# Patient Record
Sex: MALE | Race: WHITE | NOT HISPANIC OR LATINO | Employment: OTHER | URBAN - METROPOLITAN AREA
[De-identification: names, ages, dates, MRNs, and addresses within clinical notes are randomized per-mention and may not be internally consistent; named-entity substitution may affect disease eponyms.]

---

## 2017-02-15 ENCOUNTER — HOSPITAL ENCOUNTER (EMERGENCY)
Facility: HOSPITAL | Age: 52
Discharge: HOME | End: 2017-02-15
Attending: EMERGENCY MEDICINE
Payer: MEDICARE

## 2017-02-15 VITALS
SYSTOLIC BLOOD PRESSURE: 134 MMHG | BODY MASS INDEX: 27.3 KG/M2 | RESPIRATION RATE: 17 BRPM | HEIGHT: 71 IN | TEMPERATURE: 98 F | WEIGHT: 195 LBS | HEART RATE: 73 BPM | DIASTOLIC BLOOD PRESSURE: 78 MMHG | OXYGEN SATURATION: 97 %

## 2017-02-15 PROCEDURE — 25000003 PHARM REV CODE 250: Performed by: EMERGENCY MEDICINE

## 2017-02-15 PROCEDURE — 96375 TX/PRO/DX INJ NEW DRUG ADDON: CPT

## 2017-02-15 PROCEDURE — 99284 EMERGENCY DEPT VISIT MOD MDM: CPT | Mod: 25

## 2017-02-15 PROCEDURE — 96372 THER/PROPH/DIAG INJ SC/IM: CPT

## 2017-02-15 PROCEDURE — 63600175 PHARM REV CODE 636 W HCPCS: Performed by: EMERGENCY MEDICINE

## 2017-02-15 PROCEDURE — 96374 THER/PROPH/DIAG INJ IV PUSH: CPT

## 2017-02-15 PROCEDURE — 90715 TDAP VACCINE 7 YRS/> IM: CPT | Performed by: EMERGENCY MEDICINE

## 2017-02-15 PROCEDURE — 12032 INTMD RPR S/A/T/EXT 2.6-7.5: CPT

## 2017-02-15 PROCEDURE — 90471 IMMUNIZATION ADMIN: CPT | Performed by: EMERGENCY MEDICINE

## 2017-02-15 RX ORDER — MORPHINE SULFATE 4 MG/ML
4 INJECTION, SOLUTION INTRAMUSCULAR; INTRAVENOUS
Status: COMPLETED | OUTPATIENT
Start: 2017-02-15 | End: 2017-02-15

## 2017-02-15 RX ORDER — CEFAZOLIN SODIUM 1 G/3ML
1 INJECTION, POWDER, FOR SOLUTION INTRAMUSCULAR; INTRAVENOUS
Status: COMPLETED | OUTPATIENT
Start: 2017-02-15 | End: 2017-02-15

## 2017-02-15 RX ORDER — ONDANSETRON 2 MG/ML
4 INJECTION INTRAMUSCULAR; INTRAVENOUS
Status: COMPLETED | OUTPATIENT
Start: 2017-02-15 | End: 2017-02-15

## 2017-02-15 RX ORDER — LIDOCAINE HYDROCHLORIDE 10 MG/ML
1 INJECTION, SOLUTION EPIDURAL; INFILTRATION; INTRACAUDAL; PERINEURAL ONCE
Status: COMPLETED | OUTPATIENT
Start: 2017-02-15 | End: 2017-02-15

## 2017-02-15 RX ORDER — SULFAMETHOXAZOLE AND TRIMETHOPRIM 800; 160 MG/1; MG/1
1 TABLET ORAL 2 TIMES DAILY
Qty: 20 TABLET | Refills: 0 | Status: SHIPPED | OUTPATIENT
Start: 2017-02-15 | End: 2017-02-15

## 2017-02-15 RX ORDER — SULFAMETHOXAZOLE AND TRIMETHOPRIM 800; 160 MG/1; MG/1
1 TABLET ORAL 2 TIMES DAILY
Qty: 20 TABLET | Refills: 0 | Status: SHIPPED | OUTPATIENT
Start: 2017-02-15 | End: 2017-02-25

## 2017-02-15 RX ORDER — MELOXICAM 15 MG/1
15 TABLET ORAL DAILY
Qty: 10 TABLET | Refills: 0 | Status: SHIPPED | OUTPATIENT
Start: 2017-02-15

## 2017-02-15 RX ORDER — MELOXICAM 15 MG/1
15 TABLET ORAL DAILY
Qty: 10 TABLET | Refills: 0 | Status: SHIPPED | OUTPATIENT
Start: 2017-02-15 | End: 2017-02-15

## 2017-02-15 RX ADMIN — LIDOCAINE HYDROCHLORIDE 10 MG: 10 INJECTION, SOLUTION EPIDURAL; INFILTRATION; INTRACAUDAL; PERINEURAL at 02:02

## 2017-02-15 RX ADMIN — CLOSTRIDIUM TETANI TOXOID ANTIGEN (FORMALDEHYDE INACTIVATED), CORYNEBACTERIUM DIPHTHERIAE TOXOID ANTIGEN (FORMALDEHYDE INACTIVATED), BORDETELLA PERTUSSIS TOXOID ANTIGEN (GLUTARALDEHYDE INACTIVATED), BORDETELLA PERTUSSIS FILAMENTOUS HEMAGGLUTININ ANTIGEN (FORMALDEHYDE INACTIVATED), BORDETELLA PERTUSSIS PERTACTIN ANTIGEN, AND BORDETELLA PERTUSSIS FIMBRIAE 2/3 ANTIGEN 0.5 ML: 5; 2; 2.5; 5; 3; 5 INJECTION, SUSPENSION INTRAMUSCULAR at 01:02

## 2017-02-15 RX ADMIN — CEFAZOLIN 1 G: 330 INJECTION, POWDER, FOR SOLUTION INTRAMUSCULAR; INTRAVENOUS at 03:02

## 2017-02-15 RX ADMIN — MORPHINE SULFATE 4 MG: 4 INJECTION, SOLUTION INTRAMUSCULAR; INTRAVENOUS at 12:02

## 2017-02-15 RX ADMIN — ONDANSETRON 4 MG: 2 INJECTION INTRAMUSCULAR; INTRAVENOUS at 12:02

## 2017-02-15 NOTE — ED NOTES
Dr. Retana finished with pt. Laceration to right elbow , both sutures and staples used. Dressing applied to wound.

## 2017-02-15 NOTE — ED AVS SNAPSHOT
[facility] -   [id number]  [place] [address]               Marcio Joseph   2/15/2017 11:46 AM   ED    Description:  Male : 1965   Department:  [facility] -            Your Care was Coordinated By:     Provider Role From To    Elier Retana MD Attending Provider 02/15/17 1158 --      Reason for Visit     Laceration           Diagnoses this Visit        Comments    Laceration           ED Disposition     None           To Do List            These Medications        Disp Refills Start End    meloxicam (MOBIC) 15 MG tablet 10 tablet 0 2/15/2017     Take 1 tablet (15 mg total) by mouth once daily. - Oral    sulfamethoxazole-trimethoprim 800-160mg (BACTRIM DS) 800-160 mg Tab 20 tablet 0 2/15/2017 2017    Take 1 tablet by mouth 2 (two) times daily. - Oral      [facility] Care [facility] -  Assistance  Registered nurses in the [facility] provide clinical advisement, health education, appointment booking, and other advisory services.  Call for this free service at [phone].             Medications           Message regarding Medications     Verify the changes and/or additions to your medication regime listed below are the same as discussed with your clinician today.  If any of these changes or additions are incorrect, please notify your healthcare provider.        START taking these NEW medications        Refills    meloxicam (MOBIC) 15 MG tablet 0    Sig: Take 1 tablet (15 mg total) by mouth once daily.    Class: Print    Route: Oral    sulfamethoxazole-trimethoprim 800-160mg (BACTRIM DS) 800-160 mg Tab 0    Sig: Take 1 tablet by mouth 2 (two) times daily.    Class: Print    Route: Oral      These medications were administered today        Dose Freq    morphine injection 4 mg 4 mg ED 1 Time    Sig: Inject 1 mL (4 mg total) into the vein ED 1 Time.    Class: Normal    Route:  Intravenous    ondansetron injection 4 mg 4 mg ED 1 Time    Sig: Inject 4 mg into the vein ED 1 Time.    Class: Normal    Route: Intravenous    Tdap vaccine injection 0.5 mL 0.5 mL Once    Sig: Inject 0.5 mLs into the muscle once.    Class: Normal    Route: Intramuscular    lidocaine (PF) 10 mg/ml (1%) injection 10 mg 1 mL Once    Si mL (10 mg total) by Other route once.    Class: Normal    Route: Other    lidocaine (PF) 10 mg/ml (1%) injection 10 mg 1 mL Once    Si mL (10 mg total) by Other route once.    Class: Normal    Route: Other    lidocaine (PF) 10 mg/ml (1%) injection 10 mg 1 mL Once    Si mL (10 mg total) by Other route once.    Class: Normal    Route: Other    lidocaine (PF) 10 mg/ml (1%) injection 10 mg 1 mL Once    Si mL (10 mg total) by Other route once.    Class: Normal    Route: Other    ceFAZolin injection 1 g 1 g ED 1 Time    Sig: Inject 1,000 mg (1 g total) into the muscle ED 1 Time.    Class: Normal    Route: Intramuscular           Verify that the below list of medications is an accurate representation of the medications you are currently taking.  If none reported, the list may be blank. If incorrect, please contact your healthcare provider. Carry this list with you in case of emergency.           Current Medications     ceFAZolin injection 1 g Inject 1,000 mg (1 g total) into the muscle ED 1 Time.    INSULIN DETEMIR (LEVEMIR SUBQ) Inject 40 Units into the skin every evening.    insulin detemir (LEVEMIR) 100 unit/mL injection Inject 10 Units into the skin once daily.    meloxicam (MOBIC) 15 MG tablet Take 1 tablet (15 mg total) by mouth once daily.    metformin (GLUCOPHAGE) 1000 MG tablet Take 1,000 mg by mouth 2 (two) times daily with meals.    sulfamethoxazole-trimethoprim 800-160mg (BACTRIM DS) 800-160 mg Tab Take 1 tablet by mouth 2 (two) times daily.           Clinical Reference Information           Your Vitals Were     BP Pulse Temp Resp Height Weight    148/76 87 97.4 °F  "(36.3 °C) 16 5' 11" (1.803 m) 88.5 kg (195 lb)    SpO2 BMI             97% 27.2 kg/m2         Allergies as of 2/15/2017     No Known Allergies      Immunizations Administered on Date of Encounter - 2/15/2017     Name Date Dose VIS Date Route    TDAP 2/15/2017 0.5 mL 2/24/2015 Intramuscular      ED Micro, Lab, POCT     None      ED Imaging Orders     Start Ordered       Status Ordering Provider    02/15/17 1203 02/15/17 1203  X-Ray Elbow Complete 3 views Right  1 time imaging      Final result       Discharge References/Attachments     LACERATION, EXTREMITY: SUTURE, STAPLE, OR TAPE (ENGLISH)      [facility] Sign-Up     Activating your [facility] account is as easy as 1-2-3!     1) Visit [email or web address], select Sign Up Now, enter this activation code and your date of birth, then select Next.  [email or web address]  Expires: 4/1/2017  3:12 PM      2) Create a username and password to use when you visit [facility] in the future and select a security question in case you lose your password and select Next.    3) Enter your e-mail address and click Sign Up!    Additional Information  If you have questions, please e-mail [email or web address] or call [phone] to talk to our [facility] staff. Remember, [facility] is NOT to be used for urgent needs. For medical emergencies, dial 911.         Smoking Cessation     If you would like to quit smoking:   You may be eligible for free services if you are a [place] resident and started smoking cigarettes before September 1, 1988.  Call the [employer]oking [employer]essation [employer] ([employer]) toll free at (802) 765-0545 or (179) 087-1810.   Call 8-328-QUIT-NOW if you do not meet the above criteria.             [facility] complies with applicable Federal civil rights laws and does not discriminate on the basis of race, color, national origin, age, disability, or sex.        Language Assistance Services     ATTENTION: Language assistance services are available, free of charge. Please call " [phone].      ATENCIÓN: Si habla español, tiene a liu disposición servicios gratuitos de asistencia lingüística. Llame al [phone].     CHÚ Ý: N?u b?n nói Ti?ng Vi?t, có các d?ch v? h? tr? ngôn ng? mi?n phí dành cho b?n. G?i s? [phone].

## 2017-02-15 NOTE — ED NOTES
Dr. Retana at bedside suturing pt. Pt. Tolerating well, no acute distress noted, will continue to monitor.

## 2017-02-15 NOTE — ED PROVIDER NOTES
SCRIBE #1 NOTE: I, Tianna Lance, am scribing for, and in the presence of, Elier Retana MD. I have scribed the entire note.      History      Chief Complaint   Patient presents with    Laceration     pt tripped into a table saw and cut his right elbow       Review of patient's allergies indicates:  No Known Allergies     HPI   HPI    2/15/2017, 12:00 PM   History obtained from the patient      History of Present Illness: Marcio Joseph is a 51 y.o. male patient who presents to the Emergency Department for a laceration which onset suddenly PTA. Laceration is located to the right elbow. Pt reports that laceration onset after tripping and falling on an active table saw. Sx have been constant and moderate in severity. No modifying factors noted. No associated sx included. Pt denies any fever, chills, paresthesias, or decreased ROM of RUE. Pt's tetanus is not UTD. No further complaints at this time.       Arrival mode: Personal vehicle      PCP: Manolo Zamora MD       Past Medical History:  Past Medical History   Diagnosis Date    Diabetes mellitus        Past Surgical History:  Reviewed. Not pertinent       Family History:  Reviewed. Not pertinent     Social History:  Social History     Social History Main Topics    Smoking status: Unknown     Smokeless tobacco: Current User    Alcohol use Yes    Drug use: No    Sexual activity: Unknown        ROS   Review of Systems   Constitutional: Negative for chills, diaphoresis and fever.   HENT: Negative for sore throat.    Respiratory: Negative for shortness of breath.    Cardiovascular: Negative for chest pain.   Gastrointestinal: Negative for nausea and vomiting.   Genitourinary: Negative for dysuria.   Musculoskeletal: Negative for back pain.        (-) decreased ROM of RUE   Skin: Negative for rash.        (+) laceration to right elbow   Neurological: Negative for dizziness, weakness, light-headedness, numbness and headaches.        (-) paresthesias    Hematological: Does not bruise/bleed easily.     Physical Exam    Initial Vitals   BP Pulse Resp Temp SpO2   02/15/17 1145 02/15/17 1145 02/15/17 1145 02/15/17 1145 02/15/17 1145   163/73 100 20 97.4 °F (36.3 °C) 97 %      Physical Exam  Nursing Notes and Vital Signs Reviewed.  Constitutional: Patient is in no acute distress. Awake and alert. Well-developed and well-nourished.  Head: Atraumatic. Normocephalic.  Eyes: PERRL. EOM intact. Conjunctivae are not pale. No scleral icterus.  ENT: Mucous membranes are moist. Oropharynx is clear and symmetric.    Neck: Supple. Full ROM. No lymphadenopathy.  Cardiovascular: Regular rate. Regular rhythm. No murmurs, rubs, or gallops. Distal pulses are 2+ and symmetric.  Pulmonary/Chest: No respiratory distress. Clear to auscultation bilaterally. No wheezing, rales, or rhonchi.  Abdominal: Soft and non-distended.    Musculoskeletal: Moves all extremities. No obvious deformities. Radial pulses are 2+ and equal bilaterally. Distal sensation is intact. FROM of right elbow.   Skin: Warm and dry.  4 cm x 3 cm v-shaped laceration over the right elbow with exposure of the olecranon with divoting into the surface of the olecranon from table saw blade.   Neurological:  Alert, awake, and appropriate.  Normal speech.  Grossly neurologically intact.   Psychiatric: Normal affect. Good eye contact. Appropriate in content.    ED Course    Lac Repair  Date/Time: 2/15/2017 2:40 PM  Performed by: ROYER LOW.  Authorized by: ROYER LOW   Body area: upper extremity  Location details: right elbow  Wound length (cm): 4 cm x 3 cm.  Foreign bodies: no foreign bodies  Tendon involvement: none  Anesthesia: local infiltration    Anesthesia:  Anesthesia: local infiltration  Local Anesthetic: lidocaine 1% without epinephrine   Anesthetic total: 20 mL  Preparation: Patient was prepped and draped in the usual sterile fashion.  Irrigation solution: saline  Amount of cleaning:  "extensive  Debridement: minimal  Degree of undermining: none  Skin closure: staples and 3-0 nylon (four 3-0 Ethilon)  Subcutaneous closure: 4-0 Vicryl (3 )  Number of sutures: 7 sutures and 17 staples.  Technique: simple  Approximation: close  Patient tolerance: Patient tolerated the procedure well with no immediate complications        ED Vital Signs:  Vitals:    02/15/17 1145 02/15/17 1300   BP: (!) 163/73 (!) 148/76   Pulse: 100 87   Resp: 20 16   Temp: 97.4 °F (36.3 °C)    SpO2: 97% 97%   Weight: 88.5 kg (195 lb)    Height: 5' 11" (1.803 m)        Imaging Results:  Imaging Results         X-Ray Elbow Complete 3 views Right (Final result) Result time:  02/15/17 13:15:16    Final result by Vince Ken MD (02/15/17 13:15:16)    Impression:         No fracture or dislocation of the right elbow.        Electronically signed by: VINCE KEN MD  Date:     02/15/17  Time:    13:15     Narrative:    Exam: XR ELBOW COMPLETE 3 VIEW RIGHT    Clinical History:    Pain in the right elbow.    Findings:      No fracture or dislocation of the right elbow.There appears to be some abrasion or soft tissue injury along the dorsal aspect of the elbow overlying the olecranon.                      The Emergency Provider reviewed the vital signs and test results, which are outlined above.    ED Discussion     1:04 PM: Re-evaluated pt. Pt is resting comfortably and is in NAD. Pt states that pain has improved. Answered all questions. Pt expresses understanding at this time.    2:00 PM: Re-evaluated pt. Wound irrigated with 1 L normal saline. Will consult orthopedics for further instruction.     2:30 PM: Dr. Retana discussed the pt's case with Dr. Guardado (Orthopedics). Discussed physical exam findings and x-ray results with Dr. Guardado.  Dr. Guardado recommends irrigating and closing wound in ED. Recommends giving pt a dose of abx in ED and sending pt home with Bactrim DS for 15 days. Recommends having pt f/u with PCP.      3:14 PM: " Reassessed pt at this time.  Pt states his condition has improvee at this time. Discussed with pt all pertinent ED information and results. Discussed pt dx  and plan of tx. Gave pt all f/u and return to the ED instructions. Instructed pt to f/u with PCP. All questions and concerns were addressed at this time. Pt expresses understanding of information and instructions, and is comfortable with plan to discharge. Pt is stable for discharge.      ED Medication(s):  Medications   ceFAZolin injection 1 g (not administered)   morphine injection 4 mg (4 mg Intravenous Given 2/15/17 1220)   ondansetron injection 4 mg (4 mg Intravenous Given 2/15/17 1220)   Tdap vaccine injection 0.5 mL (0.5 mLs Intramuscular Given 2/15/17 1333)   lidocaine (PF) 10 mg/ml (1%) injection 10 mg (10 mg Other Given 2/15/17 1400)   lidocaine (PF) 10 mg/ml (1%) injection 10 mg (10 mg Other Given 2/15/17 1400)   lidocaine (PF) 10 mg/ml (1%) injection 10 mg (10 mg Other Given 2/15/17 1444)   lidocaine (PF) 10 mg/ml (1%) injection 10 mg (10 mg Other Given 2/15/17 1444)       New Prescriptions    MELOXICAM (MOBIC) 15 MG TABLET    Take 1 tablet (15 mg total) by mouth once daily.    SULFAMETHOXAZOLE-TRIMETHOPRIM 800-160MG (BACTRIM DS) 800-160 MG TAB    Take 1 tablet by mouth 2 (two) times daily.       Medical Decision Making    Medical Decision Making:   Clinical Tests:   Radiological Study: Ordered and Reviewed           Scribe Attestation:   Scribe #1: I performed the above scribed service and the documentation accurately describes the services I performed. I attest to the accuracy of the note.    Attending:   Physician Attestation Statement for Scribe #1: I, Elier Retana MD, personally performed the services described in this documentation, as scribed by Tianna Lance, in my presence, and it is both accurate and complete.          Clinical Impression       ICD-10-CM ICD-9-CM   1. Laceration T14.8 879.8       Disposition:   Disposition:  Discharged  Condition: Stable         Elier Retana MD  02/15/17 1951

## 2018-09-04 ENCOUNTER — PES CALL (OUTPATIENT)
Dept: ADMINISTRATIVE | Facility: CLINIC | Age: 53
End: 2018-09-04

## 2019-08-14 ENCOUNTER — PES CALL (OUTPATIENT)
Dept: ADMINISTRATIVE | Facility: CLINIC | Age: 54
End: 2019-08-14